# Patient Record
Sex: MALE | Race: WHITE | NOT HISPANIC OR LATINO | Employment: FULL TIME | URBAN - METROPOLITAN AREA
[De-identification: names, ages, dates, MRNs, and addresses within clinical notes are randomized per-mention and may not be internally consistent; named-entity substitution may affect disease eponyms.]

---

## 2020-01-04 ENCOUNTER — OFFICE VISIT (OUTPATIENT)
Dept: URGENT CARE | Facility: CLINIC | Age: 30
End: 2020-01-04
Payer: COMMERCIAL

## 2020-01-04 VITALS
WEIGHT: 145 LBS | BODY MASS INDEX: 20.76 KG/M2 | HEIGHT: 70 IN | DIASTOLIC BLOOD PRESSURE: 89 MMHG | HEART RATE: 87 BPM | OXYGEN SATURATION: 99 % | SYSTOLIC BLOOD PRESSURE: 136 MMHG | TEMPERATURE: 98 F

## 2020-01-04 DIAGNOSIS — S61.211A LACERATION OF LEFT INDEX FINGER WITHOUT FOREIGN BODY WITHOUT DAMAGE TO NAIL, INITIAL ENCOUNTER: Primary | ICD-10-CM

## 2020-01-04 PROCEDURE — 99204 PR OFFICE/OUTPT VISIT, NEW, LEVL IV, 45-59 MIN: ICD-10-PCS | Mod: 25,S$GLB,, | Performed by: NURSE PRACTITIONER

## 2020-01-04 PROCEDURE — 12001 RPR S/N/AX/GEN/TRNK 2.5CM/<: CPT | Mod: S$GLB,,, | Performed by: NURSE PRACTITIONER

## 2020-01-04 PROCEDURE — 12001 LACERATION REPAIR: ICD-10-PCS | Mod: S$GLB,,, | Performed by: NURSE PRACTITIONER

## 2020-01-04 PROCEDURE — 99204 OFFICE O/P NEW MOD 45 MIN: CPT | Mod: 25,S$GLB,, | Performed by: NURSE PRACTITIONER

## 2020-01-04 RX ORDER — CEPHALEXIN 500 MG/1
500 CAPSULE ORAL 4 TIMES DAILY
Qty: 40 CAPSULE | Refills: 0 | Status: SHIPPED | OUTPATIENT
Start: 2020-01-04 | End: 2020-01-14

## 2020-01-04 NOTE — PATIENT INSTRUCTIONS
Keep dry and change dressing daily for 3 days. Do not pick off glue let fall off by itself    Please return here or go to the Emergency Department for any concerns or worsening of condition.  If you were prescribed antibiotics, please take them to completion.  If you were prescribed a narcotic medication, do not drive or operate heavy equipment or machinery while taking these medications.  Please follow up with your primary care doctor or specialist as needed.

## 2020-01-04 NOTE — PROGRESS NOTES
"Subjective:       Patient ID: Raj Rao is a 29 y.o. male.    Vitals:  height is 5' 10" (1.778 m) and weight is 65.8 kg (145 lb). His temperature is 98.3 °F (36.8 °C). His blood pressure is 136/89 and his pulse is 87. His oxygen saturation is 99%.     Chief Complaint: Laceration    Pt had Tdap in 6/2019. Pt cut his left 2nd digit with a clean knife.    Laceration    The incident occurred less than 1 hour ago. The laceration is located on the left hand. The laceration is 1 cm in size. The laceration mechanism was a clean knife. The pain is at a severity of 1/10. The pain is mild. He reports no foreign bodies present. His tetanus status is UTD.       Constitution: Negative for chills, fatigue and fever.   HENT: Negative for congestion and sore throat.    Neck: Negative for painful lymph nodes.   Cardiovascular: Negative for chest pain and leg swelling.   Eyes: Negative for double vision and blurred vision.   Respiratory: Negative for cough and shortness of breath.    Gastrointestinal: Negative for nausea, vomiting and diarrhea.   Genitourinary: Negative for dysuria, frequency and urgency.   Musculoskeletal: Negative for joint pain, joint swelling, muscle cramps and muscle ache.   Skin: Positive for laceration. Negative for color change, pale and rash.   Allergic/Immunologic: Negative for seasonal allergies.   Neurological: Negative for dizziness, history of vertigo, light-headedness, passing out and headaches.   Hematologic/Lymphatic: Negative for swollen lymph nodes, easy bruising/bleeding and history of blood clots. Does not bruise/bleed easily.   Psychiatric/Behavioral: Negative for nervous/anxious, sleep disturbance and depression. The patient is not nervous/anxious.        Objective:      Physical Exam   Constitutional: He is oriented to person, place, and time. He appears well-developed and well-nourished. He is cooperative.  Non-toxic appearance. He does not appear ill. No distress.   HENT:   Head: " Normocephalic and atraumatic.   Right Ear: Hearing, tympanic membrane, external ear and ear canal normal.   Left Ear: Hearing, tympanic membrane, external ear and ear canal normal.   Nose: Nose normal. No mucosal edema, rhinorrhea or nasal deformity. No epistaxis. Right sinus exhibits no maxillary sinus tenderness and no frontal sinus tenderness. Left sinus exhibits no maxillary sinus tenderness and no frontal sinus tenderness.   Mouth/Throat: Uvula is midline, oropharynx is clear and moist and mucous membranes are normal. No trismus in the jaw. Normal dentition. No uvula swelling. No posterior oropharyngeal erythema.   Eyes: Conjunctivae and lids are normal. Right eye exhibits no discharge. Left eye exhibits no discharge. No scleral icterus.   Neck: Trachea normal, normal range of motion, full passive range of motion without pain and phonation normal. Neck supple.   Cardiovascular: Normal rate, regular rhythm, normal heart sounds, intact distal pulses and normal pulses.   Pulmonary/Chest: Effort normal and breath sounds normal. No respiratory distress.   Abdominal: Soft. Normal appearance and bowel sounds are normal. He exhibits no distension, no pulsatile midline mass and no mass. There is no tenderness.   Musculoskeletal: Normal range of motion. He exhibits no edema or deformity.   Neurological: He is alert and oriented to person, place, and time. He exhibits normal muscle tone. Coordination normal.   Skin: Skin is warm, dry, intact, not diaphoretic and not pale. Capillary refill takes less than 2 seconds.   2+ laceration to left pointer finger. 0.1 cm deep.    Psychiatric: He has a normal mood and affect. His speech is normal and behavior is normal. Judgment and thought content normal. Cognition and memory are normal.   Nursing note and vitals reviewed.        Assessment:       1. Laceration of left index finger without foreign body without damage to nail, initial encounter        Plan:         Laceration of  left index finger without foreign body without damage to nail, initial encounter  -     Laceration Repair  -     cephALEXin (KEFLEX) 500 MG capsule; Take 1 capsule (500 mg total) by mouth 4 (four) times daily. for 10 days  Dispense: 40 capsule; Refill: 0         1) laceration repair with glue without obbservable complications      Keep dry and change dressing daily for 3 days. Do not pick off glue let fall off by itself    Please return here or go to the Emergency Department for any concerns or worsening of condition.  If you were prescribed antibiotics, please take them to completion.  If you were prescribed a narcotic medication, do not drive or operate heavy equipment or machinery while taking these medications.  Please follow up with your primary care doctor or specialist as needed.    If you  smoke, please stop smoking.

## 2020-01-04 NOTE — PROCEDURES
Laceration Repair  Date/Time: 1/4/2020 12:35 PM  Performed by: Nunu Joel NP  Authorized by: Nunu Joel NP   Body area: upper extremity  Location details: left index finger  Laceration length: 1 cm  Tendon involvement: none  Nerve involvement: none  Vascular damage: no  Patient sedated: no  Irrigation method: syringe  Amount of cleaning: standard  Debridement: none  Degree of undermining: none  Skin closure: glue  Approximation: close  Approximation difficulty: simple  Dressing: pressure/compression dressing  Patient tolerance: Patient tolerated the procedure well with no immediate complications  Comments: Laceration was very superficial. NO sutures needed. Cleaned with saline and iodine without observable complication. Ice and pressure to stop bleeding without observable complications. Glue applied. Pressure dressing applied. TJ

## 2022-05-05 ENCOUNTER — LAB VISIT (OUTPATIENT)
Dept: LAB | Facility: HOSPITAL | Age: 32
End: 2022-05-05
Attending: UROLOGY
Payer: COMMERCIAL

## 2022-05-05 ENCOUNTER — OFFICE VISIT (OUTPATIENT)
Dept: UROLOGY | Facility: CLINIC | Age: 32
End: 2022-05-05
Payer: COMMERCIAL

## 2022-05-05 VITALS
WEIGHT: 136.88 LBS | DIASTOLIC BLOOD PRESSURE: 79 MMHG | HEIGHT: 70 IN | HEART RATE: 80 BPM | BODY MASS INDEX: 19.6 KG/M2 | SYSTOLIC BLOOD PRESSURE: 125 MMHG

## 2022-05-05 DIAGNOSIS — I86.1 VARICOCELE: ICD-10-CM

## 2022-05-05 DIAGNOSIS — E29.1 TESTICULAR FAILURE: Primary | ICD-10-CM

## 2022-05-05 DIAGNOSIS — E29.1 TESTICULAR FAILURE: ICD-10-CM

## 2022-05-05 LAB — TESTOST SERPL-MCNC: 719 NG/DL (ref 304–1227)

## 2022-05-05 PROCEDURE — 3078F PR MOST RECENT DIASTOLIC BLOOD PRESSURE < 80 MM HG: ICD-10-PCS | Mod: CPTII,S$GLB,, | Performed by: UROLOGY

## 2022-05-05 PROCEDURE — 99999 PR PBB SHADOW E&M-EST. PATIENT-LVL III: CPT | Mod: PBBFAC,,, | Performed by: UROLOGY

## 2022-05-05 PROCEDURE — 84403 ASSAY OF TOTAL TESTOSTERONE: CPT | Performed by: UROLOGY

## 2022-05-05 PROCEDURE — 84146 ASSAY OF PROLACTIN: CPT | Performed by: UROLOGY

## 2022-05-05 PROCEDURE — 3008F PR BODY MASS INDEX (BMI) DOCUMENTED: ICD-10-PCS | Mod: CPTII,S$GLB,, | Performed by: UROLOGY

## 2022-05-05 PROCEDURE — 1160F RVW MEDS BY RX/DR IN RCRD: CPT | Mod: CPTII,S$GLB,, | Performed by: UROLOGY

## 2022-05-05 PROCEDURE — 1159F PR MEDICATION LIST DOCUMENTED IN MEDICAL RECORD: ICD-10-PCS | Mod: CPTII,S$GLB,, | Performed by: UROLOGY

## 2022-05-05 PROCEDURE — 3078F DIAST BP <80 MM HG: CPT | Mod: CPTII,S$GLB,, | Performed by: UROLOGY

## 2022-05-05 PROCEDURE — 99203 PR OFFICE/OUTPT VISIT, NEW, LEVL III, 30-44 MIN: ICD-10-PCS | Mod: S$GLB,,, | Performed by: UROLOGY

## 2022-05-05 PROCEDURE — 36415 COLL VENOUS BLD VENIPUNCTURE: CPT | Performed by: UROLOGY

## 2022-05-05 PROCEDURE — 83002 ASSAY OF GONADOTROPIN (LH): CPT | Performed by: UROLOGY

## 2022-05-05 PROCEDURE — 99999 PR PBB SHADOW E&M-EST. PATIENT-LVL III: ICD-10-PCS | Mod: PBBFAC,,, | Performed by: UROLOGY

## 2022-05-05 PROCEDURE — 3074F SYST BP LT 130 MM HG: CPT | Mod: CPTII,S$GLB,, | Performed by: UROLOGY

## 2022-05-05 PROCEDURE — 82670 ASSAY OF TOTAL ESTRADIOL: CPT | Performed by: UROLOGY

## 2022-05-05 PROCEDURE — 3074F PR MOST RECENT SYSTOLIC BLOOD PRESSURE < 130 MM HG: ICD-10-PCS | Mod: CPTII,S$GLB,, | Performed by: UROLOGY

## 2022-05-05 PROCEDURE — 1160F PR REVIEW ALL MEDS BY PRESCRIBER/CLIN PHARMACIST DOCUMENTED: ICD-10-PCS | Mod: CPTII,S$GLB,, | Performed by: UROLOGY

## 2022-05-05 PROCEDURE — 3008F BODY MASS INDEX DOCD: CPT | Mod: CPTII,S$GLB,, | Performed by: UROLOGY

## 2022-05-05 PROCEDURE — 99203 OFFICE O/P NEW LOW 30 MIN: CPT | Mod: S$GLB,,, | Performed by: UROLOGY

## 2022-05-05 PROCEDURE — 1159F MED LIST DOCD IN RCRD: CPT | Mod: CPTII,S$GLB,, | Performed by: UROLOGY

## 2022-05-05 PROCEDURE — 83001 ASSAY OF GONADOTROPIN (FSH): CPT | Performed by: UROLOGY

## 2022-05-05 NOTE — PROGRESS NOTES
"Chief Complaint:  Infertility    HPI:    Mr. Rao is a 31 y.o.  male who has a brother struggling with fertility.  Therefore, he questions his fertility potential.   Demond Sapp has not undergone a semen analysis. He denies a history of erectile dysfunction and ejaculatory problems.    He has achieved 0 pregnancies in the past.    Demond Sapp denies a history of exposure to harmful chemicals, toxins, and radiation.    No history of recent fevers greater than 101.5 degrees Farenheit.    No history of recent exposure to "wet heat."    No history of urological trauma or testicular torsion.    No history of prostatitis, epididymitis, and orchitis.    No history of post-pubertal mumps.    There is no known family history of fertility problems other than his brother.      REVIEW OF SYSTEMS:     He denies headache, blurred vision, fever, nausea, vomiting, chills, abdominal pain, chest pain, sore throat, bleeding per rectum, cough, SOB, recent loss of consciousness, recent mental status changes, seizures, dizziness, or upper or lower extremity weakness.    PHYSICAL EXAM:     The patient generally appears in good health, is appropriately interactive, and is in no apparent distress.     Eyes: anicteric sclerae, moist conjunctivae; no lid-lag; PERRLA     HENT: Atraumatic; oropharynx clear with moist mucous membranes and no mucosal ulcerations;normal hard and soft palate.  No evidence of lymphadenopathy.    Neck: Trachea midline.  No thyromegaly.    Skin: No lesions.    Mental: Cooperative with normal affect.  Is oriented to time, place, and person.    Neuro: Grossly intact.    Chest: Normal inspiratory effort.   No accessory muscles.  No audible wheezes.  Respirations symmetric on inspiration and expiration.    Heart: Regular rhythm.      Abdomen:  Soft, non-tender. No masses or organomegaly. Bladder is not palpable. No evidence of flank discomfort. No evidence of inguinal hernia.    Genitourinary: Penis is normal with no " "evidence of plaques or induration. Urethral meatus is normal. Scrotum is normal. Testes are descended bilaterally with no evidence of abnormal masses or tenderness. Epididymis, vas deferens, and cord structures are normal bilaterally.  Testicular volume is approximately 18 cc bilaterally.  He has bilateral grade II varicoceles.    Extremities: No cyanosis, clubbing, or edema.    IMPRESSION & PLAN:    Mr. Rao is a 31 y.o.  male who has a brother struggling with fertility.  Therefore, he questions his fertility potential.   Demond Sapp has not undergone a semen analysis. He denies a history of erectile dysfunction and ejaculatory problems.    He has achieved 0 pregnancies in the past.    He has bilateral grade II varicoceles.    1.  FSH, LH, testosterone, prolactin, and estradiol serum levels today.  2.  Semen analysis x 2.  3.  Return to the clinic in 3 weeks to discuss test results and treatment plan.  4.  Recommend avoiding "wet heat."  5.  Recommend taking a multivitamin and 500 mg of vitamin c daily in addition to the multivitamin.  6.  Discussed varicocele's potential impact on fertility.     CC:     "

## 2022-05-06 LAB
ESTRADIOL SERPL-MCNC: 26 PG/ML (ref 11–44)
FSH SERPL-ACNC: 5.05 MIU/ML (ref 0.95–11.95)
LH SERPL-ACNC: 4.6 MIU/ML (ref 0.6–12.1)
PROLACTIN SERPL IA-MCNC: 6.4 NG/ML (ref 3.5–19.4)

## 2022-05-12 ENCOUNTER — OFFICE VISIT (OUTPATIENT)
Dept: ENDOCRINOLOGY | Facility: CLINIC | Age: 32
End: 2022-05-12
Payer: COMMERCIAL

## 2022-05-12 VITALS
DIASTOLIC BLOOD PRESSURE: 80 MMHG | HEIGHT: 70 IN | WEIGHT: 140.44 LBS | BODY MASS INDEX: 20.1 KG/M2 | SYSTOLIC BLOOD PRESSURE: 122 MMHG

## 2022-05-12 DIAGNOSIS — E34.9 ENDOCRINE DISORDER: ICD-10-CM

## 2022-05-12 PROBLEM — E29.1 TESTICULAR FAILURE: Status: RESOLVED | Noted: 2022-05-05 | Resolved: 2022-05-12

## 2022-05-12 PROCEDURE — 99203 PR OFFICE/OUTPT VISIT, NEW, LEVL III, 30-44 MIN: ICD-10-PCS | Mod: S$GLB,,, | Performed by: INTERNAL MEDICINE

## 2022-05-12 PROCEDURE — 3079F DIAST BP 80-89 MM HG: CPT | Mod: CPTII,S$GLB,, | Performed by: INTERNAL MEDICINE

## 2022-05-12 PROCEDURE — 99999 PR PBB SHADOW E&M-EST. PATIENT-LVL III: ICD-10-PCS | Mod: PBBFAC,,, | Performed by: INTERNAL MEDICINE

## 2022-05-12 PROCEDURE — 1159F PR MEDICATION LIST DOCUMENTED IN MEDICAL RECORD: ICD-10-PCS | Mod: CPTII,S$GLB,, | Performed by: INTERNAL MEDICINE

## 2022-05-12 PROCEDURE — 1160F RVW MEDS BY RX/DR IN RCRD: CPT | Mod: CPTII,S$GLB,, | Performed by: INTERNAL MEDICINE

## 2022-05-12 PROCEDURE — 1160F PR REVIEW ALL MEDS BY PRESCRIBER/CLIN PHARMACIST DOCUMENTED: ICD-10-PCS | Mod: CPTII,S$GLB,, | Performed by: INTERNAL MEDICINE

## 2022-05-12 PROCEDURE — 99203 OFFICE O/P NEW LOW 30 MIN: CPT | Mod: S$GLB,,, | Performed by: INTERNAL MEDICINE

## 2022-05-12 PROCEDURE — 1159F MED LIST DOCD IN RCRD: CPT | Mod: CPTII,S$GLB,, | Performed by: INTERNAL MEDICINE

## 2022-05-12 PROCEDURE — 3079F PR MOST RECENT DIASTOLIC BLOOD PRESSURE 80-89 MM HG: ICD-10-PCS | Mod: CPTII,S$GLB,, | Performed by: INTERNAL MEDICINE

## 2022-05-12 PROCEDURE — 3008F BODY MASS INDEX DOCD: CPT | Mod: CPTII,S$GLB,, | Performed by: INTERNAL MEDICINE

## 2022-05-12 PROCEDURE — 99999 PR PBB SHADOW E&M-EST. PATIENT-LVL III: CPT | Mod: PBBFAC,,, | Performed by: INTERNAL MEDICINE

## 2022-05-12 PROCEDURE — 3008F PR BODY MASS INDEX (BMI) DOCUMENTED: ICD-10-PCS | Mod: CPTII,S$GLB,, | Performed by: INTERNAL MEDICINE

## 2022-05-12 PROCEDURE — 3074F PR MOST RECENT SYSTOLIC BLOOD PRESSURE < 130 MM HG: ICD-10-PCS | Mod: CPTII,S$GLB,, | Performed by: INTERNAL MEDICINE

## 2022-05-12 PROCEDURE — 3074F SYST BP LT 130 MM HG: CPT | Mod: CPTII,S$GLB,, | Performed by: INTERNAL MEDICINE

## 2022-05-12 NOTE — PROGRESS NOTES
"Subjective:      Patient ID: Raj Rao is a 31 y.o. male.    Chief Complaint:  Gender     History of Present Illness    Dad is an endocrinologist from [facility] [employer]       With regards to their  gender incongruence care:    She has been aware that something has been different about her since her teen years  but just put it altogether recently.    Gender identity - maybe trans-feminine    Pronouns:  Probably she/her      Goals of therapy:  Breast tissue, body fat redistribution, have more feminine appearance, decrease body and facial hair    Sexual activity with women only      Therapist at the time hormones were started:    yes, supportive   Would like the names of other therapists are marks periods    Gender Surgeries - none,  "too soon to think about it"     Fertility concerns - is having sperm cryopreservation completed     Has not started cross hormone therapy yet and is not ready to start today       Social:  Work -    Family -   Aware supportive   Relationship, orientation - single   Housing - lives alone      no dx with  Anxiety or depression      New concerns today:  None            ROS:   As above    Objective:     /80   Ht 5' 10" (1.778 m)   Wt 63.7 kg (140 lb 6.9 oz)   BMI 20.15 kg/m²   BP Readings from Last 3 Encounters:   05/12/22 122/80   05/05/22 125/79   01/04/20 136/89     Wt Readings from Last 1 Encounters:   05/12/22 0859 63.7 kg (140 lb 6.9 oz)     Body mass index is 20.15 kg/m².      Physical Exam  Vitals reviewed.   Constitutional:       Appearance: Normal appearance.   Neck:      Comments: No goiter   Cardiovascular:      Rate and Rhythm: Normal rate.      Pulses: Normal pulses.   Pulmonary:      Effort: Pulmonary effort is normal.   Abdominal:      Palpations: Abdomen is soft.                Lab Review:    Latest Reference Range & Units 05/05/22 14:59   Estradiol 11 - 44 pg/mL 26 [1]   FSH 0.95 - 11.95 mIU/mL 5.05 [2]   LH 0.6 - 12.1 mIU/mL 4.6 [3]   Prolactin 3.5 - 19.4 " ng/mL 6.4   Testosterone, Total 304 - 1227 ng/dL 719        Assessment and Plan     Endocrine disorder  Gender.     Reviewed therapy, side effects (both wanted and unwanted), possible adverse outcomes, expectations, compliance.      Patient encouraged to continue working with a therapist during the transition process.  - named provided      She will let me know when she is interested in starting hormones

## 2022-05-12 NOTE — ASSESSMENT & PLAN NOTE
Gender.     Reviewed therapy, side effects (both wanted and unwanted), possible adverse outcomes, expectations, compliance.      Patient encouraged to continue working with a therapist during the transition process.  - named provided      She will let me know when she is interested in starting hormones

## 2022-05-12 NOTE — PATIENT INSTRUCTIONS
A few possible therapists to look into....    1)  Nabeel Sparrow, [facility]   [address], [place] [address] [phone]  [email or web address]  [email or web address]        2)  Estella Quiroz, LPC, [facility]   [address] [place]  [address]  [phone]        3)  Yamileth Pires, PhD, [facility], [facility]   [address] [place] [address] [phone]         4)  Catrachita Hardy, [place]  [phone]         5)  Tracy Thompson, [address]  [phone]          6)  Shirley Kennedy M.ED, [facility]  Licensed Counselor and Psychotherapist  [facility]  [address] [id number] [phone]  [email or web address]      7)  Ritika Acosta [facility], BAS   [address]  [place], [place]  [phone]  Operates on a fee for service basis at $80/hr or $60/45 min.

## 2022-06-22 ENCOUNTER — OFFICE VISIT (OUTPATIENT)
Dept: UROLOGY | Facility: CLINIC | Age: 32
End: 2022-06-22
Payer: COMMERCIAL

## 2022-06-22 VITALS
HEIGHT: 70 IN | BODY MASS INDEX: 19.82 KG/M2 | DIASTOLIC BLOOD PRESSURE: 73 MMHG | WEIGHT: 138.44 LBS | HEART RATE: 77 BPM | SYSTOLIC BLOOD PRESSURE: 115 MMHG

## 2022-06-22 DIAGNOSIS — I86.1 VARICOCELE: ICD-10-CM

## 2022-06-22 DIAGNOSIS — E29.1 TESTICULAR FAILURE: Primary | ICD-10-CM

## 2022-06-22 PROCEDURE — 3074F PR MOST RECENT SYSTOLIC BLOOD PRESSURE < 130 MM HG: ICD-10-PCS | Mod: CPTII,S$GLB,, | Performed by: UROLOGY

## 2022-06-22 PROCEDURE — 99999 PR PBB SHADOW E&M-EST. PATIENT-LVL III: ICD-10-PCS | Mod: PBBFAC,,, | Performed by: UROLOGY

## 2022-06-22 PROCEDURE — 3008F PR BODY MASS INDEX (BMI) DOCUMENTED: ICD-10-PCS | Mod: CPTII,S$GLB,, | Performed by: UROLOGY

## 2022-06-22 PROCEDURE — 3008F BODY MASS INDEX DOCD: CPT | Mod: CPTII,S$GLB,, | Performed by: UROLOGY

## 2022-06-22 PROCEDURE — 99214 PR OFFICE/OUTPT VISIT, EST, LEVL IV, 30-39 MIN: ICD-10-PCS | Mod: S$GLB,,, | Performed by: UROLOGY

## 2022-06-22 PROCEDURE — 99214 OFFICE O/P EST MOD 30 MIN: CPT | Mod: S$GLB,,, | Performed by: UROLOGY

## 2022-06-22 PROCEDURE — 1160F PR REVIEW ALL MEDS BY PRESCRIBER/CLIN PHARMACIST DOCUMENTED: ICD-10-PCS | Mod: CPTII,S$GLB,, | Performed by: UROLOGY

## 2022-06-22 PROCEDURE — 99999 PR PBB SHADOW E&M-EST. PATIENT-LVL III: CPT | Mod: PBBFAC,,, | Performed by: UROLOGY

## 2022-06-22 PROCEDURE — 3078F PR MOST RECENT DIASTOLIC BLOOD PRESSURE < 80 MM HG: ICD-10-PCS | Mod: CPTII,S$GLB,, | Performed by: UROLOGY

## 2022-06-22 PROCEDURE — 3074F SYST BP LT 130 MM HG: CPT | Mod: CPTII,S$GLB,, | Performed by: UROLOGY

## 2022-06-22 PROCEDURE — 1159F MED LIST DOCD IN RCRD: CPT | Mod: CPTII,S$GLB,, | Performed by: UROLOGY

## 2022-06-22 PROCEDURE — 1160F RVW MEDS BY RX/DR IN RCRD: CPT | Mod: CPTII,S$GLB,, | Performed by: UROLOGY

## 2022-06-22 PROCEDURE — 3078F DIAST BP <80 MM HG: CPT | Mod: CPTII,S$GLB,, | Performed by: UROLOGY

## 2022-06-22 PROCEDURE — 1159F PR MEDICATION LIST DOCUMENTED IN MEDICAL RECORD: ICD-10-PCS | Mod: CPTII,S$GLB,, | Performed by: UROLOGY

## 2022-06-22 NOTE — PROGRESS NOTES
"Chief Complaint:  Infertility    HPI:    Mr. Rao is a 31 y.o.  male who has a brother struggling with fertility.  Therefore, he questions his fertility potential.   Demond Sapp has not undergone a semen analysis. He denies a history of erectile dysfunction and ejaculatory problems.    Has seen endocrine for gender dysphoria.      Lab Results   Component Value Date    TOTALTESTOST 719 05/05/2022    LABLH 4.6 05/05/2022    FSH 5.05 05/05/2022    ESTRADIOL 26 05/05/2022    PROLACTIN 6.4 05/05/2022      SA 5/13/22--5.0 cc/23.5 million per cc/47%/2.5%  SA 5/20/22--5.0 cc/14 million per cc/46%/2.5%    He has achieved 0 pregnancies in the past.    Demond Sapp denies a history of exposure to harmful chemicals, toxins, and radiation.    No history of recent fevers greater than 101.5 degrees Farenheit.    No history of recent exposure to "wet heat."    No history of urological trauma or testicular torsion.    No history of prostatitis, epididymitis, and orchitis.    No history of post-pubertal mumps.    There is no known family history of fertility problems other than his brother.      REVIEW OF SYSTEMS:     He denies headache, blurred vision, fever, nausea, vomiting, chills, abdominal pain, chest pain, sore throat, bleeding per rectum, cough, SOB, recent loss of consciousness, recent mental status changes, seizures, dizziness, or upper or lower extremity weakness.    PHYSICAL EXAM:     The patient generally appears in good health, is appropriately interactive, and is in no apparent distress.     Eyes: anicteric sclerae, moist conjunctivae; no lid-lag; PERRLA     HENT: Atraumatic; oropharynx clear with moist mucous membranes and no mucosal ulcerations;normal hard and soft palate.  No evidence of lymphadenopathy.    Neck: Trachea midline.  No thyromegaly.    Skin: No lesions.    Mental: Cooperative with normal affect.  Is oriented to time, place, and person.    Neuro: Grossly intact.    Chest: Normal inspiratory effort.   No " "accessory muscles.  No audible wheezes.  Respirations symmetric on inspiration and expiration.    Heart: Regular rhythm.      Abdomen:  Soft, non-tender. No masses or organomegaly. Bladder is not palpable. No evidence of flank discomfort. No evidence of inguinal hernia.    Genitourinary: Penis is normal with no evidence of plaques or induration. Urethral meatus is normal. Scrotum is normal. Testes are descended bilaterally with no evidence of abnormal masses or tenderness. Epididymis, vas deferens, and cord structures are normal bilaterally.  Testicular volume is approximately 18 cc bilaterally.  He has bilateral grade II varicoceles.    Extremities: No cyanosis, clubbing, or edema.    IMPRESSION & PLAN:    Mr. Rao is a 31 y.o.  male who has a brother struggling with fertility.  Therefore, he questions his fertility potential.   Demond Sapp has not undergone a semen analysis. He denies a history of erectile dysfunction and ejaculatory problems.    Has seen endocrine for gender dysphoria.      Lab Results   Component Value Date    TOTALTESTOST 719 05/05/2022    LABLH 4.6 05/05/2022    FSH 5.05 05/05/2022    ESTRADIOL 26 05/05/2022    PROLACTIN 6.4 05/05/2022      SA 5/13/22--5.0 cc/23.5 million per cc/47%/2.5%  SA 5/20/22--5.0 cc/14 million per cc/46%/2.5%    He has bilateral grade II varicoceles.    1.  Independently interpreted his labs and outside SA's today.   2.  Discussed that he sperm present but has abnormal semen parameters.    3.   Discussed varicocele's potential impact on fertility.  Because he has sperm cryopreserved and is not actively trying to achieve a pregnancy, he would like to hold off on varicocele correction for now.  4.  Recommend avoiding "wet heat."  5.  Recommend taking a multivitamin and 500 mg of vitamin c daily in addition to the multivitamin.  6. RTC prn.      CC:       "

## 2022-08-09 ENCOUNTER — PATIENT MESSAGE (OUTPATIENT)
Dept: ENDOCRINOLOGY | Facility: CLINIC | Age: 32
End: 2022-08-09
Payer: COMMERCIAL

## 2022-08-10 ENCOUNTER — PATIENT MESSAGE (OUTPATIENT)
Dept: ENDOCRINOLOGY | Facility: CLINIC | Age: 32
End: 2022-08-10
Payer: COMMERCIAL

## 2022-08-11 ENCOUNTER — OFFICE VISIT (OUTPATIENT)
Dept: ENDOCRINOLOGY | Facility: CLINIC | Age: 32
End: 2022-08-11
Payer: COMMERCIAL

## 2022-08-11 PROBLEM — E29.1 TESTICULAR FAILURE: Status: RESOLVED | Noted: 2022-05-05 | Resolved: 2022-08-11

## 2022-08-11 PROCEDURE — 1159F PR MEDICATION LIST DOCUMENTED IN MEDICAL RECORD: ICD-10-PCS | Mod: CPTII,95,, | Performed by: INTERNAL MEDICINE

## 2022-08-11 PROCEDURE — 1160F PR REVIEW ALL MEDS BY PRESCRIBER/CLIN PHARMACIST DOCUMENTED: ICD-10-PCS | Mod: CPTII,95,, | Performed by: INTERNAL MEDICINE

## 2022-08-11 PROCEDURE — 99214 PR OFFICE/OUTPT VISIT, EST, LEVL IV, 30-39 MIN: ICD-10-PCS | Mod: 95,,, | Performed by: INTERNAL MEDICINE

## 2022-08-11 PROCEDURE — 1160F RVW MEDS BY RX/DR IN RCRD: CPT | Mod: CPTII,95,, | Performed by: INTERNAL MEDICINE

## 2022-08-11 PROCEDURE — 1159F MED LIST DOCD IN RCRD: CPT | Mod: CPTII,95,, | Performed by: INTERNAL MEDICINE

## 2022-08-11 PROCEDURE — 99214 OFFICE O/P EST MOD 30 MIN: CPT | Mod: 95,,, | Performed by: INTERNAL MEDICINE

## 2022-08-11 RX ORDER — ESCITALOPRAM OXALATE 10 MG/1
10 TABLET ORAL DAILY
COMMUNITY
Start: 2022-08-01 | End: 2022-11-28

## 2022-08-11 RX ORDER — ESTRADIOL 1 MG/1
1 TABLET ORAL 2 TIMES DAILY
Qty: 180 TABLET | Refills: 3 | Status: SHIPPED | OUTPATIENT
Start: 2022-08-11 | End: 2022-11-07 | Stop reason: SDUPTHER

## 2022-08-11 RX ORDER — FINASTERIDE 5 MG/1
5 TABLET, FILM COATED ORAL DAILY
Qty: 90 TABLET | Refills: 3 | Status: SHIPPED | OUTPATIENT
Start: 2022-08-11 | End: 2022-11-07 | Stop reason: SDUPTHER

## 2022-08-11 RX ORDER — SPIRONOLACTONE 25 MG/1
25 TABLET ORAL 2 TIMES DAILY
Qty: 180 TABLET | Refills: 3 | Status: SHIPPED | OUTPATIENT
Start: 2022-08-11 | End: 2022-11-07 | Stop reason: SDUPTHER

## 2022-08-11 NOTE — PROGRESS NOTES
"Subjective:      Patient ID: Raj Rao is a 31 y.o. male.    Chief Complaint:  Gender     History of Present Illness    Dad is an endocrinologist from [employer] [facility]       With regards to their  gender incongruence care:    She has been aware that something has been different about her since her teen years  but just put it altogether recently.    Gender identity - female   Pronouns:   she/her      Goals of therapy:  Breast tissue, body fat redistribution, have more feminine appearance, decrease body and facial hair    Sexual activity with women only      Therapist at the time hormones were started:  Dr. Yamileth Pires   yes, supportive       Gender Surgeries - none,  "too soon to think about it"     Fertility concerns -   sperm cryopreservation completed  [place] [facility]     Has not started cross hormone therapy yet and is ready to start today       Social:  Work -    Family -   Aware supportive   Relationship, orientation - single   Housing - lives alone      no dx with  Anxiety or depression      New concerns today:  None            ROS:   As above    Objective:     There were no vitals taken for this visit.  BP Readings from Last 3 Encounters:   06/22/22 115/73   05/12/22 122/80   05/05/22 125/79     Wt Readings from Last 1 Encounters:   06/22/22 1602 62.8 kg (138 lb 7.2 oz)     There is no height or weight on file to calculate BMI.      Physical Exam  Constitutional:       Appearance: Normal appearance.   Psychiatric:         Mood and Affect: Mood normal.         Behavior: Behavior normal.         Thought Content: Thought content normal.         Judgment: Judgment normal.                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    Patient encouraged to continue working with her therapist during the transition process.      Will start  "  estrogen replacement therapy    Estradiol 1 mg po bid   Will start   aldactone 25 mg bid  Will start   finasteride 5 mg qd for scalp hair loss prevention        RTO in 3 months with labs .  Healthy lifestyle stressed  Discussed increased risk of dvt   Avoid prolonged immobility  D/c ert prior to any procedures               The patient location is: home  The chief complaint leading to consultation is: gender    Visit type: audiovisual    Face to Face time with patient: 20 minutes of total time spent on the encounter, which includes face to face time and non-face to face time preparing to see the patient (eg, review of tests), Obtaining and/or reviewing separately obtained history, Documenting clinical information in the electronic or other health record, Independently interpreting results (not separately reported) and communicating results to the patient/family/caregiver, or Care coordination (not separately reported).         Each patient to whom he or she provides medical services by telemedicine is:  (1) informed of the relationship between the physician and patient and the respective role of any other health care provider with respect to management of the patient; and (2) notified that he or she may decline to receive medical services by telemedicine and may withdraw from such care at any time.

## 2022-08-11 NOTE — PATIENT INSTRUCTIONS
Thank you for completing a virtual visit with me!     Per our conversation:  Start estrogen 1 pill twice a day  Start Aldactone 1 pill twice a day  Start finasteride 1 pill a day  With regards to long-term storage of the sperm look into    [facility] for  [facility] [employer]   [phone] or [phone]  [email or web address]    We will plan a telemedicine or an in-clinic visit in 3 months with labs prior to that appointment.    My staff will contact you to schedule the above.     Please let me know if you have any other questions.    Thank you,  Abby Callahan MD

## 2022-08-11 NOTE — ASSESSMENT & PLAN NOTE
Transwoman: gender incongruence   Reviewed therapy, side effects (both wanted and unwanted), possible adverse outcomes, expectations, compliance.    Patient encouraged to continue working with her therapist during the transition process.      Will start   estrogen replacement therapy    Estradiol 1 mg po bid   Will start   aldactone 25 mg bid  Will start   finasteride 5 mg qd for scalp hair loss prevention        RTO in 3 months with labs .  Healthy lifestyle stressed  Discussed increased risk of dvt   Avoid prolonged immobility  D/c ert prior to any procedures

## 2022-11-07 ENCOUNTER — LAB VISIT (OUTPATIENT)
Dept: LAB | Facility: HOSPITAL | Age: 32
End: 2022-11-07
Attending: INTERNAL MEDICINE
Payer: COMMERCIAL

## 2022-11-07 LAB
ALBUMIN SERPL BCP-MCNC: 4.5 G/DL (ref 3.5–5.2)
ALP SERPL-CCNC: 37 U/L (ref 55–135)
ALT SERPL W/O P-5'-P-CCNC: 11 U/L (ref 10–44)
ANION GAP SERPL CALC-SCNC: 8 MMOL/L (ref 8–16)
AST SERPL-CCNC: 14 U/L (ref 10–40)
BILIRUB SERPL-MCNC: 0.8 MG/DL (ref 0.1–1)
BUN SERPL-MCNC: 11 MG/DL (ref 6–20)
CALCIUM SERPL-MCNC: 9.6 MG/DL (ref 8.7–10.5)
CHLORIDE SERPL-SCNC: 105 MMOL/L (ref 95–110)
CHOLEST SERPL-MCNC: 201 MG/DL (ref 120–199)
CHOLEST/HDLC SERPL: 3.2 {RATIO} (ref 2–5)
CO2 SERPL-SCNC: 27 MMOL/L (ref 23–29)
CREAT SERPL-MCNC: 1.1 MG/DL (ref 0.5–1.4)
EST. GFR  (NO RACE VARIABLE): >60 ML/MIN/1.73 M^2
ESTRADIOL SERPL-MCNC: 321 PG/ML (ref 11–44)
GLUCOSE SERPL-MCNC: 95 MG/DL (ref 70–110)
HDLC SERPL-MCNC: 63 MG/DL (ref 40–75)
HDLC SERPL: 31.3 % (ref 20–50)
LDLC SERPL CALC-MCNC: 127.8 MG/DL (ref 63–159)
NONHDLC SERPL-MCNC: 138 MG/DL
POTASSIUM SERPL-SCNC: 4.2 MMOL/L (ref 3.5–5.1)
PROT SERPL-MCNC: 7.2 G/DL (ref 6–8.4)
SODIUM SERPL-SCNC: 140 MMOL/L (ref 136–145)
TESTOST SERPL-MCNC: 263 NG/DL (ref 304–1227)
TRIGL SERPL-MCNC: 51 MG/DL (ref 30–150)

## 2022-11-07 PROCEDURE — 80061 LIPID PANEL: CPT | Performed by: INTERNAL MEDICINE

## 2022-11-07 PROCEDURE — 84403 ASSAY OF TOTAL TESTOSTERONE: CPT | Performed by: INTERNAL MEDICINE

## 2022-11-07 PROCEDURE — 36415 COLL VENOUS BLD VENIPUNCTURE: CPT | Performed by: INTERNAL MEDICINE

## 2022-11-07 PROCEDURE — 82670 ASSAY OF TOTAL ESTRADIOL: CPT | Performed by: INTERNAL MEDICINE

## 2022-11-07 PROCEDURE — 80053 COMPREHEN METABOLIC PANEL: CPT | Performed by: INTERNAL MEDICINE

## 2022-11-28 ENCOUNTER — OFFICE VISIT (OUTPATIENT)
Dept: ENDOCRINOLOGY | Facility: CLINIC | Age: 32
End: 2022-11-28
Payer: COMMERCIAL

## 2022-11-28 ENCOUNTER — TELEPHONE (OUTPATIENT)
Dept: ENDOCRINOLOGY | Facility: CLINIC | Age: 32
End: 2022-11-28

## 2022-11-28 DIAGNOSIS — Z78.9 TRANSGENDER: ICD-10-CM

## 2022-11-28 DIAGNOSIS — Z79.899 TRANSGENDER WOMAN ON HORMONE THERAPY: ICD-10-CM

## 2022-11-28 DIAGNOSIS — F64.0 TRANSGENDER WOMAN ON HORMONE THERAPY: ICD-10-CM

## 2022-11-28 PROCEDURE — 1160F RVW MEDS BY RX/DR IN RCRD: CPT | Mod: CPTII,95,, | Performed by: INTERNAL MEDICINE

## 2022-11-28 PROCEDURE — 1159F MED LIST DOCD IN RCRD: CPT | Mod: CPTII,95,, | Performed by: INTERNAL MEDICINE

## 2022-11-28 PROCEDURE — 99213 OFFICE O/P EST LOW 20 MIN: CPT | Mod: 95,,, | Performed by: INTERNAL MEDICINE

## 2022-11-28 PROCEDURE — 99213 PR OFFICE/OUTPT VISIT, EST, LEVL III, 20-29 MIN: ICD-10-PCS | Mod: 95,,, | Performed by: INTERNAL MEDICINE

## 2022-11-28 PROCEDURE — 1160F PR REVIEW ALL MEDS BY PRESCRIBER/CLIN PHARMACIST DOCUMENTED: ICD-10-PCS | Mod: CPTII,95,, | Performed by: INTERNAL MEDICINE

## 2022-11-28 PROCEDURE — 1159F PR MEDICATION LIST DOCUMENTED IN MEDICAL RECORD: ICD-10-PCS | Mod: CPTII,95,, | Performed by: INTERNAL MEDICINE

## 2022-11-28 RX ORDER — ESTRADIOL 2 MG/1
2 TABLET ORAL 2 TIMES DAILY
Qty: 180 TABLET | Refills: 3 | Status: SHIPPED | OUTPATIENT
Start: 2022-11-28 | End: 2023-03-02

## 2022-11-28 RX ORDER — SPIRONOLACTONE 50 MG/1
50 TABLET, FILM COATED ORAL 2 TIMES DAILY
Qty: 180 TABLET | Refills: 3 | Status: SHIPPED | OUTPATIENT
Start: 2022-11-28 | End: 2023-11-29

## 2022-11-28 NOTE — PATIENT INSTRUCTIONS
Thank you for completing a virtual visit with me!     Per our conversation:  Increase estrogen to 2 mg twice a day  Increase Aldactone 50 mg twice a day  Continue finasteride 1 pill a day    We will plan a telemedicine or an in-clinic visit in 3 months with labs prior to that appointment.    My staff will contact you to schedule the above.     Please let me know if you have any other questions.    Thank you,  Abby Callahan MD

## 2022-11-28 NOTE — PROGRESS NOTES
"Subjective:      Patient ID: Raj Rao is a 32 y.o. male.    Chief Complaint:  Gender     History of Present Illness    Dad is an endocrinologist from [facility]       With regards to their  gender incongruence care:    She has been aware that something has been different about her since her teen years  but just put it altogether recently.    Gender identity - female   Pronouns:   she/her      Goals of therapy:  Breast tissue, body fat redistribution, have more feminine appearance, decrease body and facial hair    Sexual activity with women only      Therapist at the time hormones were started:  Dr. Yamileth Pires   yes, supportive       Gender Surgeries - none,  "too soon to think about it"     Fertility concerns -   sperm cryopreservation completed  [facility]      started cross hormone therapy 8/22     Estradiol 1 mg po bid -sl    aldactone 25 mg bid   finasteride 5 mg qd for scalp hair loss prevention      She is happy that she started.  Mood is good.  Nipple sensitivity.  +breast growth   She is been working on her voice herself.      Social:  Work -    Family -   Aware supportive   Relationship, orientation - single   Housing - lives alone      no dx with  Anxiety or depression      New concerns today:  None            ROS:   As above    Objective:     There were no vitals taken for this visit.  BP Readings from Last 3 Encounters:   06/22/22 115/73   05/12/22 122/80   05/05/22 125/79     Wt Readings from Last 1 Encounters:   06/22/22 1602 62.8 kg (138 lb 7.2 oz)     There is no height or weight on file to calculate BMI.      Physical Exam  Constitutional:       Appearance: Normal appearance.   Psychiatric:         Mood and Affect: Mood normal.         Behavior: Behavior normal.         Thought Content: Thought content normal.         Judgment: Judgment normal.              Lab Reviewed     Latest Reference Range & Units 11/07/22 07:15   Estradiol 11 - 44 pg/mL 321 (H)   Testosterone, " Total 304 - 1227 ng/dL 263 (L)   (H): Data is abnormally high  (L): Data is abnormally low    Assessment and Plan     Transgender woman on hormone therapy  Transwoman: gender incongruence   Reviewed therapy, side effects (both wanted and unwanted), possible adverse outcomes, expectations, compliance.    Patient encouraged to continue working with her therapist during the transition process.      Will increase estrogen replacement therapy  to mg twice a day     Will increase aldactone 50 mg bid  Will continue finasteride 5 mg qd for scalp hair loss prevention        RTO in 3 months with labs .  Healthy lifestyle stressed  Discussed increased risk of dvt   Avoid prolonged immobility  D/c ert prior to any procedures           The patient location is: home  The chief complaint leading to consultation is: gender    Visit type: audiovisual    Face to Face time with patient: 20 minutes of total time spent on the encounter, which includes face to face time and non-face to face time preparing to see the patient (eg, review of tests), Obtaining and/or reviewing separately obtained history, Documenting clinical information in the electronic or other health record, Independently interpreting results (not separately reported) and communicating results to the patient/family/caregiver, or Care coordination (not separately reported).         Each patient to whom he or she provides medical services by telemedicine is:  (1) informed of the relationship between the physician and patient and the respective role of any other health care provider with respect to management of the patient; and (2) notified that he or she may decline to receive medical services by telemedicine and may withdraw from such care at any time.

## 2022-11-28 NOTE — ASSESSMENT & PLAN NOTE
Transwoman: gender incongruence   Reviewed therapy, side effects (both wanted and unwanted), possible adverse outcomes, expectations, compliance.    Patient encouraged to continue working with her therapist during the transition process.      Will increase estrogen replacement therapy  to mg twice a day     Will increase aldactone 50 mg bid  Will continue finasteride 5 mg qd for scalp hair loss prevention        RTO in 3 months with labs .  Healthy lifestyle stressed  Discussed increased risk of dvt   Avoid prolonged immobility  D/c ert prior to any procedures

## 2023-01-13 ENCOUNTER — PATIENT MESSAGE (OUTPATIENT)
Dept: ENDOCRINOLOGY | Facility: CLINIC | Age: 33
End: 2023-01-13
Payer: COMMERCIAL

## 2023-01-23 ENCOUNTER — PATIENT MESSAGE (OUTPATIENT)
Dept: ENDOCRINOLOGY | Facility: CLINIC | Age: 33
End: 2023-01-23
Payer: COMMERCIAL

## 2023-01-23 DIAGNOSIS — F64.0 TRANSGENDER WOMAN ON HORMONE THERAPY: Primary | ICD-10-CM

## 2023-01-23 DIAGNOSIS — Z79.899 TRANSGENDER WOMAN ON HORMONE THERAPY: Primary | ICD-10-CM

## 2023-02-01 ENCOUNTER — TELEPHONE (OUTPATIENT)
Dept: SPEECH THERAPY | Facility: HOSPITAL | Age: 33
End: 2023-02-01
Payer: COMMERCIAL

## 2023-02-01 NOTE — TELEPHONE ENCOUNTER
jorge pt to schedule voice therapy. pt will check with insurance company on coverage for this. Codes provided as well as number for central pricing office.

## 2023-02-17 ENCOUNTER — TELEPHONE (OUTPATIENT)
Dept: SPEECH THERAPY | Facility: HOSPITAL | Age: 33
End: 2023-02-17
Payer: COMMERCIAL

## 2023-02-17 NOTE — TELEPHONE ENCOUNTER
Arin pt to schedule in person voice eval 2/27@1pm. Pt confirmed with insurance that this is a covered service.    ----- Message from Alessio Schwartz sent at 2/17/2023  9:58 AM CST -----  Regarding: New Patient Being Referred:  Mr. Anthony is calling to schedule a NP appt.       Diagnosis: F64.0,Z79.899 (ICD-10-CM) - Transgender woman on hormone therapy      Date of Diagnosis: 1/23/23      Referring Physician:JAMIE PAN             Mr. Anthony can be reached at [phone] regarding this message.

## 2023-02-22 ENCOUNTER — LAB VISIT (OUTPATIENT)
Dept: LAB | Facility: HOSPITAL | Age: 33
End: 2023-02-22
Attending: INTERNAL MEDICINE
Payer: COMMERCIAL

## 2023-02-22 DIAGNOSIS — F64.0 TRANSGENDER WOMAN ON HORMONE THERAPY: ICD-10-CM

## 2023-02-22 DIAGNOSIS — Z79.899 TRANSGENDER WOMAN ON HORMONE THERAPY: ICD-10-CM

## 2023-02-22 LAB
ALBUMIN SERPL BCP-MCNC: 4.4 G/DL (ref 3.5–5.2)
ALP SERPL-CCNC: 35 U/L (ref 55–135)
ALT SERPL W/O P-5'-P-CCNC: 18 U/L (ref 10–44)
ANION GAP SERPL CALC-SCNC: 9 MMOL/L (ref 8–16)
AST SERPL-CCNC: 15 U/L (ref 10–40)
BILIRUB SERPL-MCNC: 0.7 MG/DL (ref 0.1–1)
BUN SERPL-MCNC: 16 MG/DL (ref 6–20)
CALCIUM SERPL-MCNC: 9.7 MG/DL (ref 8.7–10.5)
CHLORIDE SERPL-SCNC: 106 MMOL/L (ref 95–110)
CO2 SERPL-SCNC: 25 MMOL/L (ref 23–29)
CREAT SERPL-MCNC: 1.1 MG/DL (ref 0.5–1.4)
EST. GFR  (NO RACE VARIABLE): >60 ML/MIN/1.73 M^2
ESTRADIOL SERPL-MCNC: 77 PG/ML (ref 11–44)
GLUCOSE SERPL-MCNC: 94 MG/DL (ref 70–110)
POTASSIUM SERPL-SCNC: 5.4 MMOL/L (ref 3.5–5.1)
PROT SERPL-MCNC: 7.1 G/DL (ref 6–8.4)
SODIUM SERPL-SCNC: 140 MMOL/L (ref 136–145)
TESTOST SERPL-MCNC: 31 NG/DL (ref 304–1227)

## 2023-02-22 PROCEDURE — 82670 ASSAY OF TOTAL ESTRADIOL: CPT | Performed by: INTERNAL MEDICINE

## 2023-02-22 PROCEDURE — 36415 COLL VENOUS BLD VENIPUNCTURE: CPT | Performed by: INTERNAL MEDICINE

## 2023-02-22 PROCEDURE — 80053 COMPREHEN METABOLIC PANEL: CPT | Performed by: INTERNAL MEDICINE

## 2023-02-22 PROCEDURE — 84403 ASSAY OF TOTAL TESTOSTERONE: CPT | Performed by: INTERNAL MEDICINE

## 2023-02-27 ENCOUNTER — CLINICAL SUPPORT (OUTPATIENT)
Dept: SPEECH THERAPY | Facility: HOSPITAL | Age: 33
End: 2023-02-27
Attending: INTERNAL MEDICINE
Payer: COMMERCIAL

## 2023-02-27 DIAGNOSIS — Z79.899 TRANSGENDER WOMAN ON HORMONE THERAPY: ICD-10-CM

## 2023-02-27 DIAGNOSIS — F64.0 TRANSGENDER WOMAN ON HORMONE THERAPY: Primary | ICD-10-CM

## 2023-02-27 DIAGNOSIS — Z79.899 TRANSGENDER WOMAN ON HORMONE THERAPY: Primary | ICD-10-CM

## 2023-02-27 DIAGNOSIS — R49.8 OTHER VOICE AND RESONANCE DISORDERS: ICD-10-CM

## 2023-02-27 DIAGNOSIS — F64.0 TRANSGENDER WOMAN ON HORMONE THERAPY: ICD-10-CM

## 2023-02-27 PROCEDURE — 92524 BEHAVRAL QUALIT ANALYS VOICE: CPT | Mod: GN | Performed by: SPEECH-LANGUAGE PATHOLOGIST

## 2023-02-27 NOTE — PROGRESS NOTES
Referring provider: Dr. Abby Callahan  Reason for visit:  Behavioral and qualitative analysis of voice and resonance (CPT 78408)    Subjective / History    Dwayne Pradhan is a 32 y.o. trans woman (assigned male at birth) referred for voice evaluation (CPT 81797) by Dr. Callahan.  She presents with complaints of voice/gender mismatch which began several months ago.  The patient also reported the following complaints:  some vocal fatigue associated with increased pitch/resonance.  Feels like she could use a little more endurance.  At baseline is a quiet talker and has never projected very much.  Maybe a little more difficulty getting louder at the slightly higher pitch.  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       She has been trying to talk at a higher pitch/resonance.  Can feel it straining if she pushes too much.  Depends on mood.    Less concern abotu the voice, maybe more self conscious about perception.  Feels better when she uses a slightly higher pitch/ersonance but room for improvement.  Some dysphoria/discomfort.  Feels like her voice has always been a little bit high.   At work - talks deeper.  Only out to close friends and family.      Gender: trans woman, she/her  Hormones: about 6 months - good, desired side effects   Swallowing: no c/o   Breathing:  no c/o     Smokin packs/day   Caffeine: several cups/day   Reflux:  not regularly  Water: plenty    No past medical history on file.  Current Outpatient Medications on File Prior to Visit   Medication Sig Dispense Refill    estradioL (ESTRACE) 2 MG tablet Take 1 tablet (2 mg total) by mouth 2 (two) times daily. 180 tablet 3    finasteride (PROSCAR) 5 mg tablet Take 1 tablet (5 mg total) by mouth once daily. 90 tablet 3    spironolactone (ALDACTONE) 50 MG tablet Take 1 tablet (50 mg total) by mouth 2 (two) times daily. 180 tablet 3     No current facility-administered medications on file prior to visit.        Objective    Perceptual/behavioral assessment  -CAPE-V Overall Score: 7  -Quality: mild sprague  -Volume: appropriate for age and gender  -Pitch: appropriate for age and gender identity  -Flexibility: diminished  -Habitual respiratory pattern: chest/clavicular    Education / Stimulability Trials  Discussed importance of vocal hygiene including: hydration.  Patient was stimulable for improved voice using SOVT exercises.  Used straw voice exercises to access a slightly higher pitch/resonance which she found to be helpful in finding an improved voice.  Also practiced with f2 increasing strategy of slight smile during phonation which she found came naturally to her.  Encouraged ongoing practice in isolation and into connected speech to promote carryover.  Of note f0 appeared to be around g3.    Functional goals  Length Status Goal   Long term Initiated  Patient and clinician will facilitate changes in vocal function in order to restore functional use of voice for daily occupational, social, and emotional demands.    Long term Initiated   Patient will be satisfied with quality of voice as it pertains to gender identity.    Short term Initiated  Patient will complete SOVT exercises and/or resonant-focused exercises 3-5x daily to strengthen and balance the intrinsic laryngeal musculature and maximize glottic closure without medial hyperfunction.   Short term Initiated  Patient will be correctly gendered over the phone with 80% accuracy.   Short term Initiated  Patient will discriminate between easy and strained phonation with 80% accuracy.    Short term Initiated   Patient will identify the sensations associated with muscle relaxation in the abdominal, thoracic, neck and facial areas during efficient phonation with minimal clinician cue.    Short term Initiated  Patient will demonstrate the ability to increase awareness of voicing behavior through self-monitoring to facilitate generalization in functional speaking  situations with 80% accuracy.        Assessment     Patient presents with gender to voice mismatch in the setting of gender transition per Dr. Callahan.  Prognosis for continued improvement is good.     Recommendations / POC    -Recommend 2-4 sessions of voice therapy over 4-8 weeks with a speech-language pathologist to optimize glottal postures for improved vocal function, vocal efficiency, and ease of phonation  -Continue exercises as discussed in session  -Contact clinician with any further questions

## 2023-03-02 ENCOUNTER — OFFICE VISIT (OUTPATIENT)
Dept: ENDOCRINOLOGY | Facility: CLINIC | Age: 33
End: 2023-03-02
Attending: INTERNAL MEDICINE
Payer: COMMERCIAL

## 2023-03-02 ENCOUNTER — TELEPHONE (OUTPATIENT)
Dept: ENDOCRINOLOGY | Facility: CLINIC | Age: 33
End: 2023-03-02
Payer: COMMERCIAL

## 2023-03-02 DIAGNOSIS — F64.0 TRANSGENDER WOMAN ON HORMONE THERAPY: Primary | ICD-10-CM

## 2023-03-02 DIAGNOSIS — Z79.899 TRANSGENDER WOMAN ON HORMONE THERAPY: Primary | ICD-10-CM

## 2023-03-02 DIAGNOSIS — E87.5 SERUM POTASSIUM ELEVATED: ICD-10-CM

## 2023-03-02 PROCEDURE — 1160F PR REVIEW ALL MEDS BY PRESCRIBER/CLIN PHARMACIST DOCUMENTED: ICD-10-PCS | Mod: CPTII,95,, | Performed by: INTERNAL MEDICINE

## 2023-03-02 PROCEDURE — 1159F MED LIST DOCD IN RCRD: CPT | Mod: CPTII,95,, | Performed by: INTERNAL MEDICINE

## 2023-03-02 PROCEDURE — 99214 OFFICE O/P EST MOD 30 MIN: CPT | Mod: 95,,, | Performed by: INTERNAL MEDICINE

## 2023-03-02 PROCEDURE — 99214 PR OFFICE/OUTPT VISIT, EST, LEVL IV, 30-39 MIN: ICD-10-PCS | Mod: 95,,, | Performed by: INTERNAL MEDICINE

## 2023-03-02 PROCEDURE — 1160F RVW MEDS BY RX/DR IN RCRD: CPT | Mod: CPTII,95,, | Performed by: INTERNAL MEDICINE

## 2023-03-02 PROCEDURE — 1159F PR MEDICATION LIST DOCUMENTED IN MEDICAL RECORD: ICD-10-PCS | Mod: CPTII,95,, | Performed by: INTERNAL MEDICINE

## 2023-03-02 RX ORDER — ESTRADIOL 2 MG/1
3 TABLET ORAL 2 TIMES DAILY
Qty: 270 TABLET | Refills: 3 | Status: SHIPPED | OUTPATIENT
Start: 2023-03-02 | End: 2023-06-15 | Stop reason: SDUPTHER

## 2023-03-02 NOTE — PROGRESS NOTES
Subjective:      Patient ID: Raj Rao is a 32 y.o. male.    Chief Complaint:  Gender     History of Present Illness    Dad is an endocrinologist from [facility], [place]     With regards to their  gender incongruence care:    She has been aware that something has been different about her since her teen years  but just put it altogether recently.    Gender identity - female   Pronouns:   she/her      Goals of therapy:  Breast tissue, body fat redistribution, have more feminine appearance, decrease body and facial hair    Sexual activity with women only      Therapist at the time hormones were started:  Dr. Yamileth Pires   yes, supportive       Gender Surgeries - none,  FFS and maybe GRS      Fertility concerns -   sperm cryopreservation completed  [place] [facility]      started cross hormone therapy 8/22     Estradiol 2 mg po bid -sl    aldactone 50 mg bid   finasteride 5 mg qd for scalp hair loss prevention      She is happy that she started.  Mood is good.  Nipple sensitivity.  +breast growth   She is been working on her voice herself.      Social:  Work -    Family -   Aware supportive   Relationship, orientation - single   Housing - lives alone      no dx with  Anxiety or depression      New concerns today:  None            ROS:   As above    Objective:     There were no vitals taken for this visit.  BP Readings from Last 3 Encounters:   06/22/22 115/73   05/12/22 122/80   05/05/22 125/79     Wt Readings from Last 1 Encounters:   06/22/22 1602 62.8 kg (138 lb 7.2 oz)     There is no height or weight on file to calculate BMI.      Physical Exam  Constitutional:       Appearance: Normal appearance.   Psychiatric:         Mood and Affect: Mood normal.         Behavior: Behavior normal.         Thought Content: Thought content normal.         Judgment: Judgment normal.              Lab Reviewed   Latest Reference Range & Units 02/22/23 07:07   Estradiol 11 - 44 pg/mL 77 (H)   Testosterone, Total 304 - 1227  ng/dL 31 (L)   (H): Data is abnormally high  (L): Data is abnormally low low    Assessment and Plan     Transgender woman on hormone therapy  Transwoman: gender incongruence   Reviewed therapy, side effects (both wanted and unwanted), possible adverse outcomes, expectations, compliance.    Patient encouraged to continue working with her therapist during the transition process.      Will increase estrogen replacement therapy  to 3 mg twice a day     Will continue  aldactone 50 mg bid  Will continue finasteride 5 mg qd for scalp hair loss prevention        RTO in 3 months with labs .  Healthy lifestyle stressed  Discussed increased risk of dvt   Avoid prolonged immobility  D/c ert prior to any procedures           Elevated potassium on last lab   Hold off on increasing Aldactone and recheck        The patient location is: home  The chief complaint leading to consultation is: gender    Visit type: audiovisual    Face to Face time with patient: 20 minutes of total time spent on the encounter, which includes face to face time and non-face to face time preparing to see the patient (eg, review of tests), Obtaining and/or reviewing separately obtained history, Documenting clinical information in the electronic or other health record, Independently interpreting results (not separately reported) and communicating results to the patient/family/caregiver, or Care coordination (not separately reported).         Each patient to whom he or she provides medical services by telemedicine is:  (1) informed of the relationship between the physician and patient and the respective role of any other health care provider with respect to management of the patient; and (2) notified that he or she may decline to receive medical services by telemedicine and may withdraw from such care at any time.

## 2023-03-02 NOTE — ASSESSMENT & PLAN NOTE
Transwoman: gender incongruence   Reviewed therapy, side effects (both wanted and unwanted), possible adverse outcomes, expectations, compliance.    Patient encouraged to continue working with her therapist during the transition process.      Will increase estrogen replacement therapy  to 3 mg twice a day     Will continue  aldactone 50 mg bid  Will continue finasteride 5 mg qd for scalp hair loss prevention        RTO in 3 months with labs .  Healthy lifestyle stressed  Discussed increased risk of dvt   Avoid prolonged immobility  D/c ert prior to any procedures

## 2023-03-02 NOTE — PATIENT INSTRUCTIONS
Thank you for completing a virtual visit with me!     Per our conversation:  Increase estrogen to 3 mg twice a day  Continue Aldactone 50 mg twice a day, since your testosterone was low and your potassium was mildly elevated  Continue finasteride 1 pill a day    We will plan a telemedicine or an in-clinic visit in 3 months with labs prior to that appointment.       My staff will contact you to schedule the above.     Please let me know if you have any other questions.    Thank you,  Abby Callahan MD

## 2023-03-15 ENCOUNTER — CLINICAL SUPPORT (OUTPATIENT)
Dept: SPEECH THERAPY | Facility: HOSPITAL | Age: 33
End: 2023-03-15
Attending: INTERNAL MEDICINE
Payer: COMMERCIAL

## 2023-03-15 DIAGNOSIS — R49.8 OTHER VOICE AND RESONANCE DISORDERS: ICD-10-CM

## 2023-03-15 DIAGNOSIS — F64.0 TRANSGENDER WOMAN ON HORMONE THERAPY: ICD-10-CM

## 2023-03-15 DIAGNOSIS — Z79.899 TRANSGENDER WOMAN ON HORMONE THERAPY: ICD-10-CM

## 2023-03-15 PROCEDURE — 92507 TX SP LANG VOICE COMM INDIV: CPT | Mod: GN | Performed by: SPEECH-LANGUAGE PATHOLOGIST

## 2023-03-15 NOTE — PROGRESS NOTES
Referring provider: Dr. Abby Callahan  Reason for visit:  Voice treatment (CPT 44575)  Session #1    History / Subjective   I had the pleasure of seeing Raj Rao for her first treatment session following complete voice evaluation on 2/27/23.  During that time, improvements were noted on SOVT/f2 voice exercises.  Since evaluation, she has been practicing her exercises.  Some medication adjustments which are stabilizing.  Some difficulty turning it on with friends.  - she knows this may change as it becomes more comfortable.   Noticed when she was practicing with her straw it moves her resonance forward.  Thinks it's good.      Objective   The primary goal of todays session was to review HEP.  This was targeted using SOVT treatment modalities.    Perceptual/behavioral assessment  -CAPE-V Overall Score: 0  -Quality: appropriate for age and gender  -Volume: appropriate for age and gender identity  -Pitch: appropriate for age and gender identity  -Flexibility: appropriate for age and gender identity  -Habitual respiratory pattern: diaphragmatic    Treatment  Reviewed SOVT/forward focused speech primarily in connected speech.  Focused on a pitch around c3 practiced speech with forward focus in connected speech with good carryover and minimal adjustments.  For home practice, clinician reviewed home exercises as practiced during the session with the patient.     Functional goals  Length Status Goal   Long term Ongoing  Patient and clinician will facilitate changes in vocal function in order to restore functional use of voice for daily occupational, social, and emotional demands.    Long term Ongoing Patient will be satisfied with quality of voice as it pertains to gender identity.    Short term Ongoing Patient will complete SOVT exercises and/or resonant-focused exercises 3-5x daily to strengthen and balance the intrinsic laryngeal musculature and maximize glottic closure without medial hyperfunction.   Short  term Ongoing Patient will be correctly gendered over the phone with 80% accuracy.   Short term Met Patient will discriminate between easy and strained phonation with 80% accuracy.    Short term Met Patient will identify the sensations associated with muscle relaxation in the abdominal, thoracic, neck and facial areas during efficient phonation with minimal clinician cue.    Short term Ongoing Patient will demonstrate the ability to increase awareness of voicing behavior through self-monitoring to facilitate generalization in functional speaking situations with 80% accuracy.        Assessment     Patient presents with gender to voice mismatch in the setting of gender transition per Dr. Callahan.  She is making good progress toward goals.      Recommendations / POC    At this time due to pt's understanding of voice therapy strategies along with the phase of her transition, we decided to defer scheduling additional visits at this time.  I encouraged her to continue practicing regularly to develop a comfortable range voice which can be used without strain or fatigue.  She will contact me in the future to schedule additional visits.  In the future she may re-emerge as a candidate for voice therapy to optimize glottal postures for improved vocal function, vocal efficiency, and ease of phonation in the setting of her transition.

## 2023-03-29 ENCOUNTER — PATIENT MESSAGE (OUTPATIENT)
Dept: ENDOCRINOLOGY | Facility: CLINIC | Age: 33
End: 2023-03-29
Payer: COMMERCIAL

## 2023-05-26 ENCOUNTER — LAB VISIT (OUTPATIENT)
Dept: LAB | Facility: HOSPITAL | Age: 33
End: 2023-05-26
Attending: INTERNAL MEDICINE
Payer: COMMERCIAL

## 2023-05-26 DIAGNOSIS — F64.0 TRANSGENDER WOMAN ON HORMONE THERAPY: ICD-10-CM

## 2023-05-26 DIAGNOSIS — Z79.899 TRANSGENDER WOMAN ON HORMONE THERAPY: ICD-10-CM

## 2023-05-26 DIAGNOSIS — E87.5 SERUM POTASSIUM ELEVATED: ICD-10-CM

## 2023-05-26 LAB
ALBUMIN SERPL BCP-MCNC: 4.3 G/DL (ref 3.5–5.2)
ALP SERPL-CCNC: 37 U/L (ref 55–135)
ALT SERPL W/O P-5'-P-CCNC: 12 U/L (ref 10–44)
ANION GAP SERPL CALC-SCNC: 9 MMOL/L (ref 8–16)
AST SERPL-CCNC: 14 U/L (ref 10–40)
BILIRUB SERPL-MCNC: 0.7 MG/DL (ref 0.1–1)
BUN SERPL-MCNC: 12 MG/DL (ref 6–20)
CALCIUM SERPL-MCNC: 9.9 MG/DL (ref 8.7–10.5)
CHLORIDE SERPL-SCNC: 102 MMOL/L (ref 95–110)
CO2 SERPL-SCNC: 25 MMOL/L (ref 23–29)
CREAT SERPL-MCNC: 1 MG/DL (ref 0.5–1.4)
EST. GFR  (NO RACE VARIABLE): >60 ML/MIN/1.73 M^2
ESTRADIOL SERPL-MCNC: 96 PG/ML (ref 11–44)
GLUCOSE SERPL-MCNC: 85 MG/DL (ref 70–110)
POTASSIUM SERPL-SCNC: 4.8 MMOL/L (ref 3.5–5.1)
PROT SERPL-MCNC: 7.6 G/DL (ref 6–8.4)
SODIUM SERPL-SCNC: 136 MMOL/L (ref 136–145)
TESTOST SERPL-MCNC: 36 NG/DL (ref 304–1227)

## 2023-05-26 PROCEDURE — 36415 COLL VENOUS BLD VENIPUNCTURE: CPT | Performed by: INTERNAL MEDICINE

## 2023-05-26 PROCEDURE — 84403 ASSAY OF TOTAL TESTOSTERONE: CPT | Performed by: INTERNAL MEDICINE

## 2023-05-26 PROCEDURE — 82670 ASSAY OF TOTAL ESTRADIOL: CPT | Performed by: INTERNAL MEDICINE

## 2023-05-26 PROCEDURE — 80053 COMPREHEN METABOLIC PANEL: CPT | Performed by: INTERNAL MEDICINE

## 2023-06-02 ENCOUNTER — OFFICE VISIT (OUTPATIENT)
Dept: ENDOCRINOLOGY | Facility: CLINIC | Age: 33
End: 2023-06-02
Attending: INTERNAL MEDICINE
Payer: COMMERCIAL

## 2023-06-02 DIAGNOSIS — Z79.899 TRANSGENDER WOMAN ON HORMONE THERAPY: ICD-10-CM

## 2023-06-02 DIAGNOSIS — F64.0 TRANSGENDER WOMAN ON HORMONE THERAPY: ICD-10-CM

## 2023-06-02 PROCEDURE — 99213 PR OFFICE/OUTPT VISIT, EST, LEVL III, 20-29 MIN: ICD-10-PCS | Mod: 95,,, | Performed by: INTERNAL MEDICINE

## 2023-06-02 PROCEDURE — 1159F PR MEDICATION LIST DOCUMENTED IN MEDICAL RECORD: ICD-10-PCS | Mod: CPTII,95,, | Performed by: INTERNAL MEDICINE

## 2023-06-02 PROCEDURE — 1160F PR REVIEW ALL MEDS BY PRESCRIBER/CLIN PHARMACIST DOCUMENTED: ICD-10-PCS | Mod: CPTII,95,, | Performed by: INTERNAL MEDICINE

## 2023-06-02 PROCEDURE — 99213 OFFICE O/P EST LOW 20 MIN: CPT | Mod: 95,,, | Performed by: INTERNAL MEDICINE

## 2023-06-02 PROCEDURE — 1159F MED LIST DOCD IN RCRD: CPT | Mod: CPTII,95,, | Performed by: INTERNAL MEDICINE

## 2023-06-02 PROCEDURE — 1160F RVW MEDS BY RX/DR IN RCRD: CPT | Mod: CPTII,95,, | Performed by: INTERNAL MEDICINE

## 2023-06-02 NOTE — PATIENT INSTRUCTIONS
Thank you for completing a virtual visit with me!     Per our conversation, we will  continue your current medication regimen.  Please let me know if you are interested in starting short term progesterone at bedtime.  Please see chart below regarding expected changes in timeframe with hormonal therapy    We will plan a telemedicine or an in-clinic visit in 12 months with labs prior to that appointment.     Please let me know if you have any other questions.    Thank you,  Abby Callahan MD

## 2023-06-02 NOTE — ASSESSMENT & PLAN NOTE
Transwoman: gender incongruence   Reviewed therapy, side effects (both wanted and unwanted), possible adverse outcomes, expectations, compliance.    Patient encouraged to continue working with her therapist during the transition process.      Currently her levels are reasonable.  We did discuss that we can try increasing estrogen to 4 b.i.d. if she is interested.  We can also add progesterone short-term if she is interested.  She will consider both and let me know.      We also discussed expectations of hormonal therapy.         RTO in 12 months with labs .  Healthy lifestyle stressed  Discussed increased risk of dvt   Avoid prolonged immobility  D/c ert prior to any procedures

## 2023-06-02 NOTE — PROGRESS NOTES
Subjective:      Patient ID: Raj Rao is a 32 y.o. adult.    Chief Complaint:  Gender     History of Present Illness    Dad is an endocrinologist from [facility]     With regards to their  gender incongruence care:    She has been aware that something has been different about her since her teen years  but just put it altogether recently.    Gender identity - female   Pronouns:   she/her      Name legally changed: no - looking into this year  - from [place]   Gender marker changed: no     Goals of therapy:  Breast tissue, body fat redistribution, have more feminine appearance, decrease body and facial hair    Sexual activity with women only      Therapist at the time hormones were started:  Dr. Yamileth Pires   yes, supportive       Gender Surgeries - none,  FFS and maybe GRS      Fertility concerns -   sperm cryopreservation completed  [facility]      started cross hormone therapy 8/22     Estradiol 3 mg po bid -sl    aldactone 50 mg bid - did not increase since T was less than 50 on this dose   finasteride 5 mg qd for scalp hair loss prevention      She is happy that she started.  Mood is good.  Nipple sensitivity.  +breast growth   She is been working on her voice herself.      Social:  Work -    Family -   Aware supportive   Relationship, orientation - single   Housing - lives alone      no dx with  Anxiety or depression      New concerns today:  None  -- we did discuss optimal levels of estrogen as well as expected timeframe for changes           ROS:   As above    Objective:     There were no vitals taken for this visit.  BP Readings from Last 3 Encounters:   06/22/22 115/73   05/12/22 122/80   05/05/22 125/79     Wt Readings from Last 1 Encounters:   06/22/22 1602 62.8 kg (138 lb 7.2 oz)     There is no height or weight on file to calculate BMI.      Physical Exam  Constitutional:       Appearance: Normal appearance.   Psychiatric:         Mood and Affect: Mood normal.         Behavior:  Behavior normal.         Thought Content: Thought content normal.         Judgment: Judgment normal.              Lab Reviewed   Latest Reference Range & Units 05/26/23 08:57   Estradiol 11 - 44 pg/mL 96 (H)   Testosterone, Total 304 - 1227 ng/dL 36 (L)   (H): Data is abnormally high  (L): Data is abnormally low  Assessment and Plan     Transgender woman on hormone therapy  Transwoman: gender incongruence   Reviewed therapy, side effects (both wanted and unwanted), possible adverse outcomes, expectations, compliance.    Patient encouraged to continue working with her therapist during the transition process.      Currently her levels are reasonable.  We did discuss that we can try increasing estrogen to 4 b.i.d. if she is interested.  We can also add progesterone short-term if she is interested.  She will consider both and let me know.      We also discussed expectations of hormonal therapy.         RTO in 12 months with labs .  Healthy lifestyle stressed  Discussed increased risk of dvt   Avoid prolonged immobility  D/c ert prior to any procedures                  The patient location is: home  The chief complaint leading to consultation is: gender    Visit type: audiovisual    Face to Face time with patient: 20 minutes of total time spent on the encounter, which includes face to face time and non-face to face time preparing to see the patient (eg, review of tests), Obtaining and/or reviewing separately obtained history, Documenting clinical information in the electronic or other health record, Independently interpreting results (not separately reported) and communicating results to the patient/family/caregiver, or Care coordination (not separately reported).         Each patient to whom he or she provides medical services by telemedicine is:  (1) informed of the relationship between the physician and patient and the respective role of any other health care provider with respect to management of the patient; and (2)  notified that he or she may decline to receive medical services by telemedicine and may withdraw from such care at any time.

## 2023-06-14 ENCOUNTER — PATIENT MESSAGE (OUTPATIENT)
Dept: ENDOCRINOLOGY | Facility: CLINIC | Age: 33
End: 2023-06-14
Payer: COMMERCIAL

## 2023-06-14 DIAGNOSIS — Z79.899 TRANSGENDER WOMAN ON HORMONE THERAPY: Primary | ICD-10-CM

## 2023-06-14 DIAGNOSIS — F64.0 TRANSGENDER WOMAN ON HORMONE THERAPY: Primary | ICD-10-CM

## 2023-06-15 RX ORDER — ESTRADIOL 2 MG/1
4 TABLET ORAL 2 TIMES DAILY
Qty: 360 TABLET | Refills: 3 | Status: SHIPPED | OUTPATIENT
Start: 2023-06-15 | End: 2023-08-02 | Stop reason: SDUPTHER

## 2023-08-03 RX ORDER — ESTRADIOL 2 MG/1
4 TABLET ORAL 2 TIMES DAILY
Qty: 360 TABLET | Refills: 3 | Status: SHIPPED | OUTPATIENT
Start: 2023-08-03

## 2023-09-15 ENCOUNTER — LAB VISIT (OUTPATIENT)
Dept: LAB | Facility: HOSPITAL | Age: 33
End: 2023-09-15
Attending: INTERNAL MEDICINE
Payer: COMMERCIAL

## 2023-09-15 DIAGNOSIS — F64.0 TRANSGENDER WOMAN ON HORMONE THERAPY: ICD-10-CM

## 2023-09-15 DIAGNOSIS — Z79.899 TRANSGENDER WOMAN ON HORMONE THERAPY: ICD-10-CM

## 2023-09-15 LAB
ALBUMIN SERPL BCP-MCNC: 4.2 G/DL (ref 3.5–5.2)
ALP SERPL-CCNC: 31 U/L (ref 55–135)
ALT SERPL W/O P-5'-P-CCNC: 12 U/L (ref 10–44)
ANION GAP SERPL CALC-SCNC: 8 MMOL/L (ref 8–16)
AST SERPL-CCNC: 13 U/L (ref 10–40)
BILIRUB SERPL-MCNC: 0.7 MG/DL (ref 0.1–1)
BUN SERPL-MCNC: 14 MG/DL (ref 6–20)
CALCIUM SERPL-MCNC: 9 MG/DL (ref 8.7–10.5)
CHLORIDE SERPL-SCNC: 105 MMOL/L (ref 95–110)
CO2 SERPL-SCNC: 25 MMOL/L (ref 23–29)
CREAT SERPL-MCNC: 1 MG/DL (ref 0.5–1.4)
EST. GFR  (NO RACE VARIABLE): >60 ML/MIN/1.73 M^2
ESTRADIOL SERPL-MCNC: 164 PG/ML (ref 11–44)
GLUCOSE SERPL-MCNC: 83 MG/DL (ref 70–110)
POTASSIUM SERPL-SCNC: 4.1 MMOL/L (ref 3.5–5.1)
PROT SERPL-MCNC: 6.9 G/DL (ref 6–8.4)
SODIUM SERPL-SCNC: 138 MMOL/L (ref 136–145)
TESTOST SERPL-MCNC: 27 NG/DL (ref 304–1227)

## 2023-09-15 PROCEDURE — 80053 COMPREHEN METABOLIC PANEL: CPT | Performed by: INTERNAL MEDICINE

## 2023-09-15 PROCEDURE — 36415 COLL VENOUS BLD VENIPUNCTURE: CPT | Performed by: INTERNAL MEDICINE

## 2023-09-15 PROCEDURE — 82670 ASSAY OF TOTAL ESTRADIOL: CPT | Performed by: INTERNAL MEDICINE

## 2023-09-15 PROCEDURE — 84403 ASSAY OF TOTAL TESTOSTERONE: CPT | Performed by: INTERNAL MEDICINE

## 2023-09-28 ENCOUNTER — OFFICE VISIT (OUTPATIENT)
Dept: ENDOCRINOLOGY | Facility: CLINIC | Age: 33
End: 2023-09-28
Payer: COMMERCIAL

## 2023-09-28 DIAGNOSIS — Z79.899 TRANSGENDER WOMAN ON HORMONE THERAPY: ICD-10-CM

## 2023-09-28 DIAGNOSIS — F64.0 TRANSGENDER WOMAN ON HORMONE THERAPY: ICD-10-CM

## 2023-09-28 PROCEDURE — 99214 PR OFFICE/OUTPT VISIT, EST, LEVL IV, 30-39 MIN: ICD-10-PCS | Mod: 95,,, | Performed by: INTERNAL MEDICINE

## 2023-09-28 PROCEDURE — 1159F MED LIST DOCD IN RCRD: CPT | Mod: CPTII,95,, | Performed by: INTERNAL MEDICINE

## 2023-09-28 PROCEDURE — 1159F PR MEDICATION LIST DOCUMENTED IN MEDICAL RECORD: ICD-10-PCS | Mod: CPTII,95,, | Performed by: INTERNAL MEDICINE

## 2023-09-28 PROCEDURE — 1160F RVW MEDS BY RX/DR IN RCRD: CPT | Mod: CPTII,95,, | Performed by: INTERNAL MEDICINE

## 2023-09-28 PROCEDURE — 1160F PR REVIEW ALL MEDS BY PRESCRIBER/CLIN PHARMACIST DOCUMENTED: ICD-10-PCS | Mod: CPTII,95,, | Performed by: INTERNAL MEDICINE

## 2023-09-28 PROCEDURE — 99214 OFFICE O/P EST MOD 30 MIN: CPT | Mod: 95,,, | Performed by: INTERNAL MEDICINE

## 2023-09-28 RX ORDER — PROGESTERONE 100 MG/1
100 CAPSULE ORAL NIGHTLY
Qty: 90 CAPSULE | Refills: 3 | Status: SHIPPED | OUTPATIENT
Start: 2023-09-28 | End: 2024-09-27

## 2023-09-28 NOTE — PATIENT INSTRUCTIONS
Thank you for completing a virtual visit with me!     Per our conversation,  I will send in a new prescription for progesterone, take 1 pill at bedtime.    Continue the spironolactone and estrogen at the current doses.    We will plan a telemedicine or an in-clinic visit in 6 months with labs prior to that appointment.     Please let me know if you have any other questions.    Thank you,  Abby Callahan MD

## 2023-09-28 NOTE — PROGRESS NOTES
Subjective:      Patient ID: Raj Rao is a 33 y.o. adult.    Chief Complaint:  Gender     History of Present Illness    Dad is an endocrinologist from [facility]     With regards to their  gender incongruence care:    She has been aware that something has been different about her since her teen years  but just put it altogether recently.    Gender identity - female   Pronouns:   she/her      Name legally changed: no - looking into this year  - from [place]   Gender marker changed: no         Gender Surgeries - none,  FFS had consult in  - is thinking about it    maybe GRS      Fertility concerns -   sperm cryopreservation completed  [facility]      started cross hormone therapy 8/22     Estradiol 4 mg po bid -sl    aldactone 50 mg bid - did not increase since T was less than 50 on this dose   finasteride 5 mg qd for scalp hair loss prevention      She is happy that she started.  Mood is good.  Nipple sensitivity.  +breast growth     Is interested in progesterone     She is been working on her voice herself.      Social:  Work -    Family -   Aware supportive   Relationship, orientation - single   Sexual activity with women only  Housing - lives alone      no dx with  Anxiety or depression           ROS:   As above    Objective:     There were no vitals taken for this visit.  BP Readings from Last 3 Encounters:   06/22/22 115/73   05/12/22 122/80   05/05/22 125/79     Wt Readings from Last 1 Encounters:   06/22/22 1602 62.8 kg (138 lb 7.2 oz)     There is no height or weight on file to calculate BMI.      Physical Exam  Constitutional:       Appearance: Normal appearance.   Psychiatric:         Mood and Affect: Mood normal.         Behavior: Behavior normal.         Thought Content: Thought content normal.         Judgment: Judgment normal.                Lab Reviewed   Latest Reference Range & Units 09/15/23 07:32   Estradiol 11 - 44 pg/mL 164 (H)   Testosterone, Total 304 - 1227 ng/dL 27  (L)   (H): Data is abnormally high  (L): Data is abnormally low  Assessment and Plan     Transgender woman on hormone therapy  Transwoman: gender incongruence   Reviewed therapy, side effects (both wanted and unwanted), possible adverse outcomes, expectations, compliance.    Patient encouraged to continue working with her therapist during the transition process.        Add progesterone what a visit in 6 months labs prior     she will let me know if she needs a letter of support for FFS     Healthy lifestyle stressed  Discussed increased risk of dvt   Avoid prolonged immobility  D/c ert prior to any procedures                  The patient location is: home  The chief complaint leading to consultation is: gender    Visit type: audiovisual    Face to Face time with patient: 20 minutes of total time spent on the encounter, which includes face to face time and non-face to face time preparing to see the patient (eg, review of tests), Obtaining and/or reviewing separately obtained history, Documenting clinical information in the electronic or other health record, Independently interpreting results (not separately reported) and communicating results to the patient/family/caregiver, or Care coordination (not separately reported).         Each patient to whom he or she provides medical services by telemedicine is:  (1) informed of the relationship between the physician and patient and the respective role of any other health care provider with respect to management of the patient; and (2) notified that he or she may decline to receive medical services by telemedicine and may withdraw from such care at any time.

## 2023-09-28 NOTE — ASSESSMENT & PLAN NOTE
Transwoman: gender incongruence   Reviewed therapy, side effects (both wanted and unwanted), possible adverse outcomes, expectations, compliance.    Patient encouraged to continue working with her therapist during the transition process.        Add progesterone what a visit in 6 months labs prior     she will let me know if she needs a letter of support for FFS     Healthy lifestyle stressed  Discussed increased risk of dvt   Avoid prolonged immobility  D/c ert prior to any procedures

## 2023-11-27 DIAGNOSIS — Z78.9 TRANSGENDER: ICD-10-CM

## 2023-11-27 DIAGNOSIS — Z78.9 TRANSGENDER: Primary | ICD-10-CM

## 2023-11-28 RX ORDER — FINASTERIDE 5 MG/1
5 TABLET, FILM COATED ORAL
Qty: 90 TABLET | Refills: 3 | Status: SHIPPED | OUTPATIENT
Start: 2023-11-28

## 2023-11-29 RX ORDER — SPIRONOLACTONE 50 MG/1
50 TABLET, FILM COATED ORAL 2 TIMES DAILY
Qty: 180 TABLET | Refills: 3 | Status: SHIPPED | OUTPATIENT
Start: 2023-11-29